# Patient Record
Sex: FEMALE | Race: WHITE | NOT HISPANIC OR LATINO | Employment: STUDENT | RURAL
[De-identification: names, ages, dates, MRNs, and addresses within clinical notes are randomized per-mention and may not be internally consistent; named-entity substitution may affect disease eponyms.]

---
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
## 2024-05-07 ENCOUNTER — OFFICE VISIT (OUTPATIENT)
Dept: URGENT CARE | Facility: CLINIC | Age: 24
End: 2024-05-07
Payer: MEDICAID

## 2024-05-07 VITALS
RESPIRATION RATE: 16 BRPM | DIASTOLIC BLOOD PRESSURE: 73 MMHG | OXYGEN SATURATION: 97 % | HEIGHT: 64 IN | TEMPERATURE: 98.2 F | SYSTOLIC BLOOD PRESSURE: 106 MMHG | HEART RATE: 102 BPM | WEIGHT: 230 LBS | BODY MASS INDEX: 39.27 KG/M2

## 2024-05-07 DIAGNOSIS — R19.7 ACUTE DIARRHEA: Primary | ICD-10-CM

## 2024-05-07 DIAGNOSIS — Z02.89 ENCOUNTER TO OBTAIN EXCUSE FROM WORK: ICD-10-CM

## 2024-05-07 PROBLEM — F19.11 H/O: SUBSTANCE ABUSE (MULTI): Status: ACTIVE | Noted: 2024-05-07

## 2024-05-07 LAB
POC RSV RAPID ANTIGEN: NEGATIVE
POC TRIPLEX FLU A-AG: NORMAL
POC TRIPLEX FLU B-AG: NORMAL
POC TRIPLEX SARSCOV-2 AG: NORMAL

## 2024-05-07 PROCEDURE — 87428 SARSCOV & INF VIR A&B AG IA: CPT | Performed by: PHYSICIAN ASSISTANT

## 2024-05-07 PROCEDURE — 99212 OFFICE O/P EST SF 10 MIN: CPT | Performed by: PHYSICIAN ASSISTANT

## 2024-05-07 NOTE — PROGRESS NOTES
[facility]RGENT [facility]ARE   GREY NOTE:      Name: Shell Novak, 23 y.o.    CSN:[id number]   MRN:[id number]    PCP: No primary care provider on file.    ALL:    Allergies   Allergen Reactions    Amoxicillin Rash       History:    Chief Complaint: Diarrhea (DIARRHEA, NAUSEA,)    Encounter Date: 2024      HPI: The history was obtained from the patient. Shell is a 23 y.o. female, who presents with a chief complaint of Diarrhea (DIARRHEA, NAUSEA,)   Shell has had 2 days of loose stool. When she tries to eat, even soup she immediately has to use the bathroom. No blood or mucous. Endorses HA, body aches, and fatigue. Denies fever, ear pain, rhinorrhea, cough, or SOB. Denies urinary frequency or pain. Denies vomiting, abdominal discomfort, or cramping. She states she had FluB in 2024 and her symptoms were similar. Denies any medication use today..    Denies any substance use.     PMHx:    Past Medical History:   Diagnosis Date    H/O: substance abuse (Multi)               No current outpatient medications on file.     No current facility-administered medications for this visit.         PMSx:    Past Surgical History:   Procedure Laterality Date     SECTION, CLASSIC  10/13/2021       Fam Hx: No family history on file.    SOC. Hx:     Social History     Socioeconomic History    Marital status: Single     Spouse name: Not on file    Number of children: Not on file    Years of education: Not on file    Highest education level: Not on file   Occupational History    Not on file   Tobacco Use    Smoking status: Never    Smokeless tobacco: Never   Vaping Use    Vaping status: Every Day   Substance and Sexual Activity    Alcohol use: Not on file    Drug use: Not on file    Sexual activity: Not on file   Other Topics Concern    Not on file   Social History Narrative    Not on file     Social Determinants of Health     Financial Resource Strain: Not on file   Food Insecurity: Not on file    Transportation Needs: Not on file   Physical Activity: Not on file   Stress: Not on file   Social Connections: Not on file   Intimate Partner Violence: Not on file   Housing Stability: Not on file         Vitals:    05/07/24 1254   BP: 106/73   Pulse: 102   Resp: 16   Temp: 36.8 °C (98.2 °F)   SpO2: 97%     104 kg (230 lb)          Physical Exam  Vitals reviewed.   Constitutional:       Appearance: Normal appearance.   HENT:      Head: Normocephalic and atraumatic.      Right Ear: Tympanic membrane and ear canal normal.      Left Ear: Tympanic membrane and ear canal normal.      Nose: Nose normal.      Mouth/Throat:      Mouth: Mucous membranes are moist.   Eyes:      Extraocular Movements: Extraocular movements intact.      Conjunctiva/sclera: Conjunctivae normal.      Pupils: Pupils are equal, round, and reactive to light.   Cardiovascular:      Rate and Rhythm: Normal rate.      Heart sounds: Normal heart sounds.   Pulmonary:      Effort: Pulmonary effort is normal. No respiratory distress.      Breath sounds: Normal breath sounds.   Abdominal:      General: Abdomen is flat. Bowel sounds are normal. There is no distension.      Palpations: Abdomen is soft. There is no mass.      Tenderness: There is no abdominal tenderness (diffusely across infraumbilical region). There is no right CVA tenderness, left CVA tenderness, guarding or rebound.      Hernia: No hernia is present.   Musculoskeletal:      Cervical back: Normal range of motion. No rigidity.   Skin:     General: Skin is warm and dry.      Capillary Refill: Capillary refill takes less than 2 seconds.   Neurological:      General: No focal deficit present.      Mental Status: She is alert and oriented to person, place, and time.   Psychiatric:         Mood and Affect: Mood normal.         Behavior: Behavior normal.         Thought Content: Thought content normal.         Judgment: Judgment normal.         LABORATORY @ RADIOLOGICAL IMAGING (if done):      Results for orders placed or performed in visit on 05/07/24 (from the past 24 hour(s))   POCT BD Veritor Triplex Ag   Result Value Ref Range    POC Triplex SARS-CoV-2 Ag  Presumptive negative for Triplex SARS-CoV-2 (no antigen detected)     Presumptive negative for Triplex SARS-CoV-2 (no antigen detected)    POC Triplex Flu A-Ag  Presumptive negative for Triplex FLU A (no antigen detected)     Presumptive negative for Triplex FLU A (no antigen detected)    POC Triplex Flu B-Ag  Presumptive negative for Triplex FLU B (no antigen detected)     Presumptive negative for Triplex FLU B (no antigen detected)   POCT respiratory syncytial virus manually resulted   Result Value Ref Range    RSV Rapid Ag Negative Negative       ____________________________________________________________________    I did personally review Shell's past medical history, surgical history, social history, as well as family history (when relevant).  In this case, I also oversaw the her drug management by reviewing her medication list, allergy list, as well as the medications that I prescribed during the UC course and/or recommended as an out-patient (including possible OTC medications such as acetaminophen, NSAIDs , etc).    After reviewing the items above, I did not look at previous medical documentation, such as recent hospitalizations, office visits, and/or recent consultations with PCP/specialist.                          SDOH:   Another factor that I considered in Shell's care was her Social Determinants of Health (SDOH). During this UC encounter, she did not have social determinants of health. Those SDOH influencing Shell's care are: none      _____________________________________________________________________      UC COURSE/MEDICAL DECISION MAKING:    Shell is a 23 y.o., who presents with a working diagnosis of   1. Acute diarrhea    2. Encounter to obtain excuse from work     with a differential to include:   Gastroenteritis, viral  uri, IBS.    Viral swab obtained for COVID/flu/RSV all negative; she was notified by [facility]AC[facility] GROVES    Shell requested no pharmacologic management if not necessary. Requests a note for work. Discussed diet as a means to manage her symptoms.      Mulugeta Newman PA-C   Advanced Practice Provider  [facility]    I was present with the PA student who participated in the documentation of this note. I have personally seen and re-examined the patient and performed the medical decision-making components (assessment and plan of care). I have reviewed the PA student documentation and verified the findings in the note as written with additions or exceptions as stated in the body of this note.    Mulugeta Newman PA-C

## 2024-05-07 NOTE — PROGRESS NOTES
"[facility] URGENT CARE   GREY NOTE:      Name: Shell Novak, 23 y.o.    CSN:[id number]   MRN:[id number]    PCP: No primary care provider on file.    ALL:    Allergies   Allergen Reactions    Amoxicillin Rash       History:    Chief Complaint: Diarrhea (DIARRHEA, NAUSEA,)    Encounter Date: 2024  ***    HPI: The history was obtained from the {[facility] HISTORIAN:[id number]::\"patient\"}. Shell is a 23 y.o. female, who presents with a chief complaint of Diarrhea (DIARRHEA, NAUSEA,) ***    PMHx:    No past medical history on file.           No current outpatient medications on file.     No current facility-administered medications for this visit.         PMSx:    Past Surgical History:   Procedure Laterality Date     SECTION, CLASSIC  10/13/2021       Fam Hx: No family history on file.    SOC. Hx:     Social History     Socioeconomic History    Marital status: Single     Spouse name: Not on file    Number of children: Not on file    Years of education: Not on file    Highest education level: Not on file   Occupational History    Not on file   Tobacco Use    Smoking status: Never    Smokeless tobacco: Never   Vaping Use    Vaping status: Every Day   Substance and Sexual Activity    Alcohol use: Not on file    Drug use: Not on file    Sexual activity: Not on file   Other Topics Concern    Not on file   Social History Narrative    Not on file     Social Determinants of Health     Financial Resource Strain: Not on file   Food Insecurity: Not on file   Transportation Needs: Not on file   Physical Activity: Not on file   Stress: Not on file   Social Connections: Not on file   Intimate Partner Violence: Not on file   Housing Stability: Not on file         Vitals:    24 1254   BP: 106/73   Pulse: 102   Resp: 16   Temp: 36.8 °C (98.2 °F)   SpO2: 97%     104 kg (230 lb)          Physical Exam  ***    LABORATORY @ RADIOLOGICAL IMAGING (if done):     No results found for this or any previous visit (from " the past 24 hour(s)).    ____________________________________________________________________    I did personally review Shell's past medical history, surgical history, social history, as well as family history (when relevant).  In this case, I also oversaw the her drug management by reviewing her medication list, allergy list, as well as the medications that I prescribed during the UC course and/or recommended as an out-patient (including possible OTC medications such as acetaminophen, NSAIDs , etc).    After reviewing the items above, I {DID/DID NOT:12432} look at previous medical documentation, such as recent hospitalizations, office visits, and/or recent consultations with PCP/specialist.                          SDOH:   Another factor that I considered in Shell's care was her Social Determinants of Health (SDOH). During this UC encounter, she {DID/DID NOT:47162} have social determinants of health. Those SDOH influencing Shell's care are: {pkvsdoh:44321}      _____________________________________________________________________      UC COURSE/MEDICAL DECISION MAKING:    Shell is a 23 y.o., who presents with a working diagnosis of No diagnosis found. with a differential to include:         Mulugeta Newman PA-C   Advanced Practice Provider  [facility]

## 2024-05-07 NOTE — LETTER
May 7, 2024     Patient: Shell Novak   YOB: 2000   Date of Visit: 5/7/2024       To Whom It May Concern:    It is my medical opinion that Shell Novak may return to work on 05/07/24 .    If you have any questions or concerns, please don't hesitate to call.         Sincerely,        Mulugeta Newman PA-C    CC: No Recipients